# Patient Record
Sex: FEMALE | Race: WHITE | URBAN - METROPOLITAN AREA
[De-identification: names, ages, dates, MRNs, and addresses within clinical notes are randomized per-mention and may not be internally consistent; named-entity substitution may affect disease eponyms.]

---

## 2020-06-18 ENCOUNTER — OFFICE VISIT (OUTPATIENT)
Dept: URBAN - METROPOLITAN AREA CLINIC 128 | Facility: CLINIC | Age: 60
End: 2020-06-18

## 2020-06-18 RX ORDER — ESTRADIOL 0.1 MG/D
FILM, EXTENDED RELEASE TRANSDERMAL
Qty: 0 | Refills: 0 | COMMUNITY
Start: 1900-01-01

## 2020-06-18 RX ORDER — BIFIDOBACTERIUM LONGUM 10MM CELL
TAKE 1 CAPSULE BY ORAL ROUTE DAILY FOR 90 DAYS CAPSULE ORAL 1
Qty: 90 | Refills: 3 | COMMUNITY
Start: 2019-12-19 | End: 2020-12-13

## 2020-06-18 RX ORDER — DICYCLOMINE HYDROCHLORIDE 10 MG/1
TAKE 1 CAPSULE BY ORAL ROUTE 4 TIMES A DAY FOR 90 DAYS CAPSULE ORAL
Qty: 360 | Refills: 3 | COMMUNITY
Start: 2019-12-19 | End: 2020-12-13

## 2020-06-18 RX ORDER — DICYCLOMINE HYDROCHLORIDE 20 MG/2ML
INJECTION, SOLUTION INTRAMUSCULAR
Qty: 0 | Refills: 0 | COMMUNITY
Start: 1900-01-01

## 2020-06-18 RX ORDER — LEVOTHYROXINE SODIUM 100 UG/1
TABLET ORAL
Qty: 0 | Refills: 0 | COMMUNITY
Start: 1900-01-01

## 2020-07-25 ENCOUNTER — TELEPHONE ENCOUNTER (OUTPATIENT)
Dept: URBAN - METROPOLITAN AREA CLINIC 13 | Facility: CLINIC | Age: 60
End: 2020-07-25

## 2020-07-26 ENCOUNTER — TELEPHONE ENCOUNTER (OUTPATIENT)
Dept: URBAN - METROPOLITAN AREA CLINIC 13 | Facility: CLINIC | Age: 60
End: 2020-07-26

## 2020-07-26 RX ORDER — CALCIUM CITRATE/VITAMIN D3 315MG-6.25
TAKE 1 TABLET ONCE DAILY TABLET ORAL
Refills: 0 | Status: ACTIVE | COMMUNITY
Start: 2007-06-22

## 2020-07-26 RX ORDER — ACETAMINOPHEN, ASPIRIN (NSAID) AND CAFFEINE 250; 250; 65 MG/1; MG/1; MG/1
TAKE  TABLET AS NEEDED TABLET, FILM COATED ORAL
Refills: 0 | Status: ACTIVE | COMMUNITY
Start: 2007-06-22

## 2020-11-25 ENCOUNTER — OFFICE VISIT (OUTPATIENT)
Dept: URBAN - METROPOLITAN AREA CLINIC 128 | Facility: CLINIC | Age: 60
End: 2020-11-25

## 2020-12-21 ENCOUNTER — ERX REFILL RESPONSE (OUTPATIENT)
Dept: URBAN - METROPOLITAN AREA CLINIC 13 | Facility: CLINIC | Age: 60
End: 2020-12-21

## 2020-12-21 RX ORDER — DICYCLOMINE HYDROCHLORIDE 10 MG/1
TAKE ONE CAPSULE BY MOUTH FOUR TIMES A DAY CAPSULE ORAL
Qty: 360 | Refills: 2

## 2021-01-27 ENCOUNTER — WEB ENCOUNTER (OUTPATIENT)
Dept: URBAN - METROPOLITAN AREA CLINIC 19 | Facility: CLINIC | Age: 61
End: 2021-01-27

## 2021-01-27 ENCOUNTER — OFFICE VISIT (OUTPATIENT)
Dept: URBAN - METROPOLITAN AREA CLINIC 19 | Facility: CLINIC | Age: 61
End: 2021-01-27
Payer: COMMERCIAL

## 2021-01-27 VITALS
HEART RATE: 76 BPM | WEIGHT: 131.8 LBS | DIASTOLIC BLOOD PRESSURE: 67 MMHG | BODY MASS INDEX: 24.25 KG/M2 | SYSTOLIC BLOOD PRESSURE: 110 MMHG | HEIGHT: 62 IN | TEMPERATURE: 98 F

## 2021-01-27 DIAGNOSIS — Z87.19 HISTORY OF ULCERATIVE COLITIS: ICD-10-CM

## 2021-01-27 DIAGNOSIS — K91.850 POUCHITIS: ICD-10-CM

## 2021-01-27 DIAGNOSIS — R14.0 BLOATING: ICD-10-CM

## 2021-01-27 DIAGNOSIS — Z12.11 COLON CANCER SCREENING: ICD-10-CM

## 2021-01-27 DIAGNOSIS — K58.9 IBS (IRRITABLE BOWEL SYNDROME)-DIARRHEA: ICD-10-CM

## 2021-01-27 PROCEDURE — 3017F COLORECTAL CA SCREEN DOC REV: CPT | Performed by: INTERNAL MEDICINE

## 2021-01-27 PROCEDURE — G8420 CALC BMI NORM PARAMETERS: HCPCS | Performed by: INTERNAL MEDICINE

## 2021-01-27 PROCEDURE — 99214 OFFICE O/P EST MOD 30 MIN: CPT | Performed by: INTERNAL MEDICINE

## 2021-01-27 PROCEDURE — G8482 FLU IMMUNIZE ORDER/ADMIN: HCPCS | Performed by: INTERNAL MEDICINE

## 2021-01-27 PROCEDURE — G9903 PT SCRN TBCO ID AS NON USER: HCPCS | Performed by: INTERNAL MEDICINE

## 2021-01-27 PROCEDURE — G8427 DOCREV CUR MEDS BY ELIG CLIN: HCPCS | Performed by: INTERNAL MEDICINE

## 2021-01-27 RX ORDER — DICYCLOMINE HYDROCHLORIDE 20 MG/2ML
INJECTION, SOLUTION INTRAMUSCULAR
Qty: 0 | Refills: 0 | COMMUNITY
Start: 1900-01-01

## 2021-01-27 RX ORDER — DICYCLOMINE HYDROCHLORIDE 10 MG/1
TAKE ONE CAPSULE BY MOUTH FOUR TIMES A DAY CAPSULE ORAL
Qty: 360 | Refills: 2 | Status: ACTIVE | COMMUNITY

## 2021-01-27 RX ORDER — LEVOTHYROXINE SODIUM 100 UG/1
TABLET ORAL
Qty: 0 | Refills: 0 | COMMUNITY
Start: 1900-01-01

## 2021-01-27 RX ORDER — ESTRADIOL 0.1 MG/D
FILM, EXTENDED RELEASE TRANSDERMAL
Qty: 0 | Refills: 0 | COMMUNITY
Start: 1900-01-01

## 2021-01-27 RX ORDER — ACETAMINOPHEN, ASPIRIN (NSAID) AND CAFFEINE 250; 250; 65 MG/1; MG/1; MG/1
TAKE  TABLET AS NEEDED TABLET, FILM COATED ORAL
Refills: 0 | Status: ACTIVE | COMMUNITY
Start: 2007-06-22

## 2021-01-27 RX ORDER — CALCIUM CITRATE/VITAMIN D3 315MG-6.25
TAKE 1 TABLET ONCE DAILY TABLET ORAL
Refills: 0 | Status: ACTIVE | COMMUNITY
Start: 2007-06-22

## 2021-01-27 NOTE — HPI-TODAY'S VISIT:
pt last seen in 12/2019 prior hx of ulcerative colitis s/p colectomy with j pouch done 25 yrs ago. s/p pouchoscopy in 10/4/19 with normal biopsies she comes in feeling well had flare of diarrhea recently due to stres due to her  being hospitalized for CHF she is well controlled but avoids dairy, red meat, and heavy fiber intake she is now on align twice daily, bentyl qid, digestive enzymes, and peppermint occ bloating no n/v no dysphagia stable weight notes 1-2 formed stool per day no blood.  no pain previously tried xifaxan without relief normal fecal elastace in 2019 prior cte with left sided small bowel thickening with infectious vs inflammatory enteritis and hydronephrosis  previously with mild improvement with lactose free diet and FODMAP Diet.   last flex in 12/2016 with normal j pouch on exam and on pathology she drinks lactose free milk and avoids icecream.  ? lactose intolerance.    s/p candelaria  no other complaints.

## 2021-01-28 LAB
A/G RATIO: 2
ALBUMIN: 4.5
ALKALINE PHOSPHATASE: 85
ALT (SGPT): 12
AST (SGOT): 23
BASO (ABSOLUTE): 0
BASOS: 1
BILIRUBIN, TOTAL: 0.4
BUN/CREATININE RATIO: 13
BUN: 9
C-REACTIVE PROTEIN, QUANT: 1
CALCIUM: 9.7
CARBON DIOXIDE, TOTAL: 26
CHLORIDE: 99
CREATININE: 0.69
EGFR IF AFRICN AM: 110
EGFR IF NONAFRICN AM: 96
EOS (ABSOLUTE): 0.1
EOS: 2
GLOBULIN, TOTAL: 2.2
GLUCOSE: 84
HEMATOCRIT: 44.2
HEMATOLOGY COMMENTS:: (no result)
HEMOGLOBIN: 14.6
IMMATURE CELLS: (no result)
IMMATURE GRANS (ABS): 0
IMMATURE GRANULOCYTES: 0
LYMPHS (ABSOLUTE): 1.2
LYMPHS: 22
MCH: 30
MCHC: 33
MCV: 91
MONOCYTES(ABSOLUTE): 0.5
MONOCYTES: 9
NEUTROPHILS (ABSOLUTE): 3.5
NEUTROPHILS: 66
NRBC: (no result)
PLATELETS: 258
POTASSIUM: 4.5
PROTEIN, TOTAL: 6.7
RBC: 4.86
RDW: 12.5
SODIUM: 138
WBC: 5.4

## 2021-03-11 ENCOUNTER — TELEPHONE ENCOUNTER (OUTPATIENT)
Dept: URBAN - METROPOLITAN AREA CLINIC 19 | Facility: CLINIC | Age: 61
End: 2021-03-11

## 2021-03-11 RX ORDER — CALCIUM CITRATE/VITAMIN D3 315MG-6.25
TAKE 1 TABLET ONCE DAILY TABLET ORAL
Refills: 0 | Status: ACTIVE | COMMUNITY
Start: 2007-06-22

## 2021-03-11 RX ORDER — LEVOTHYROXINE SODIUM 100 UG/1
TABLET ORAL
Qty: 0 | Refills: 0 | COMMUNITY
Start: 1900-01-01

## 2021-03-11 RX ORDER — DICYCLOMINE HYDROCHLORIDE 20 MG/2ML
INJECTION, SOLUTION INTRAMUSCULAR
Qty: 0 | Refills: 0 | COMMUNITY
Start: 1900-01-01

## 2021-03-11 RX ORDER — HYOSCYAMINE SULFATE 0.12 MG/1
1 TABLET UNDER THE TONGUE AND ALLOW TO DISSOLVE  AS NEEDED TABLET ORAL; SUBLINGUAL THREE TIMES A DAY
Qty: 90 | Refills: 1 | OUTPATIENT
Start: 2021-03-15 | End: 2021-09-11

## 2021-03-11 RX ORDER — DICYCLOMINE HYDROCHLORIDE 10 MG/1
TAKE ONE CAPSULE BY MOUTH FOUR TIMES A DAY CAPSULE ORAL
Qty: 360 | Refills: 2 | Status: ACTIVE | COMMUNITY

## 2021-03-11 RX ORDER — ESTRADIOL 0.1 MG/D
FILM, EXTENDED RELEASE TRANSDERMAL
Qty: 0 | Refills: 0 | COMMUNITY
Start: 1900-01-01

## 2021-03-11 RX ORDER — ACETAMINOPHEN, ASPIRIN (NSAID) AND CAFFEINE 250; 250; 65 MG/1; MG/1; MG/1
TAKE  TABLET AS NEEDED TABLET, FILM COATED ORAL
Refills: 0 | Status: ACTIVE | COMMUNITY
Start: 2007-06-22

## 2021-03-31 ENCOUNTER — TELEPHONE ENCOUNTER (OUTPATIENT)
Dept: URBAN - METROPOLITAN AREA CLINIC 96 | Facility: CLINIC | Age: 61
End: 2021-03-31

## 2021-09-21 ENCOUNTER — ERX REFILL RESPONSE (OUTPATIENT)
Dept: URBAN - METROPOLITAN AREA CLINIC 80 | Facility: CLINIC | Age: 61
End: 2021-09-21

## 2021-09-21 RX ORDER — DICYCLOMINE HYDROCHLORIDE 10 MG/1
TAKE ONE CAPSULE BY MOUTH FOUR TIMES A DAY CAPSULE ORAL
Qty: 360 CAPSULE | Refills: 3 | OUTPATIENT

## 2021-09-21 RX ORDER — DICYCLOMINE HYDROCHLORIDE 10 MG/1
TAKE ONE CAPSULE BY MOUTH FOUR TIMES A DAY CAPSULE ORAL
Qty: 360 | Refills: 2 | OUTPATIENT

## 2022-01-12 ENCOUNTER — OFFICE VISIT (OUTPATIENT)
Dept: URBAN - METROPOLITAN AREA CLINIC 19 | Facility: CLINIC | Age: 62
End: 2022-01-12

## 2022-02-09 ENCOUNTER — OFFICE VISIT (OUTPATIENT)
Dept: URBAN - METROPOLITAN AREA CLINIC 19 | Facility: CLINIC | Age: 62
End: 2022-02-09
Payer: COMMERCIAL

## 2022-02-09 VITALS
WEIGHT: 135 LBS | TEMPERATURE: 98.2 F | SYSTOLIC BLOOD PRESSURE: 128 MMHG | HEART RATE: 75 BPM | BODY MASS INDEX: 24.84 KG/M2 | DIASTOLIC BLOOD PRESSURE: 76 MMHG | HEIGHT: 62 IN

## 2022-02-09 DIAGNOSIS — K91.850 POUCHITIS: ICD-10-CM

## 2022-02-09 DIAGNOSIS — Z87.19 HISTORY OF ULCERATIVE COLITIS: ICD-10-CM

## 2022-02-09 DIAGNOSIS — Z12.11 COLON CANCER SCREENING: ICD-10-CM

## 2022-02-09 DIAGNOSIS — K58.9 IBS (IRRITABLE BOWEL SYNDROME)-DIARRHEA: ICD-10-CM

## 2022-02-09 DIAGNOSIS — R14.0 BLOATING: ICD-10-CM

## 2022-02-09 PROCEDURE — 99213 OFFICE O/P EST LOW 20 MIN: CPT | Performed by: INTERNAL MEDICINE

## 2022-02-09 RX ORDER — DICYCLOMINE HYDROCHLORIDE 20 MG/2ML
INJECTION, SOLUTION INTRAMUSCULAR
Qty: 0 | Refills: 0 | COMMUNITY
Start: 1900-01-01

## 2022-02-09 RX ORDER — DICYCLOMINE HYDROCHLORIDE 10 MG/1
TAKE ONE CAPSULE BY MOUTH FOUR TIMES A DAY CAPSULE ORAL
Qty: 360 CAPSULE | Refills: 3 | Status: ACTIVE | COMMUNITY

## 2022-02-09 RX ORDER — CALCIUM CITRATE/VITAMIN D3 315MG-6.25
TAKE 1 TABLET ONCE DAILY TABLET ORAL
Refills: 0 | Status: ACTIVE | COMMUNITY
Start: 2007-06-22

## 2022-02-09 RX ORDER — ESTRADIOL 0.1 MG/D
FILM, EXTENDED RELEASE TRANSDERMAL
Qty: 0 | Refills: 0 | COMMUNITY
Start: 1900-01-01

## 2022-02-09 RX ORDER — ACETAMINOPHEN, ASPIRIN (NSAID) AND CAFFEINE 250; 250; 65 MG/1; MG/1; MG/1
TAKE  TABLET AS NEEDED TABLET, FILM COATED ORAL
Refills: 0 | Status: ACTIVE | COMMUNITY
Start: 2007-06-22

## 2022-02-09 RX ORDER — LEVOTHYROXINE SODIUM 100 UG/1
TABLET ORAL
Qty: 0 | Refills: 0 | COMMUNITY
Start: 1900-01-01

## 2022-02-09 NOTE — HPI-TODAY'S VISIT:
pt last seen in 1/2021 prior hx of ulcerative colitis s/p colectomy with j pouch done 25 yrs ago. s/p pouchoscopy in 10/4/19 with normal biopsies had normal labs  here for 1 year f/u  she continues to do well normal stools occ flares related to stress from caring from her  who has chf she is avoiding dairy, MSG and casen she is on probiotics, tid bentyl and peppermint supplement and doing well normal appetite rare bloating no n/v no dysphagia stable weight  stools at baseline with 1-2 formed stool per day no blood.  no mucus  no other complaints.    Prior hx: previously tried xifaxan without relief normal fecal elastace in 2019 prior cte with left sided small bowel thickening with infectious vs inflammatory enteritis and hydronephrosis  previously with mild improvement with lactose free diet and FODMAP Diet.   last flex in 12/2016 with normal j pouch on exam and on pathology she drinks lactose free milk and avoids icecream.  ? lactose intolerance.    s/p candelaria

## 2022-02-12 LAB
A/G RATIO: 2.3
ALBUMIN: 4.6
ALKALINE PHOSPHATASE: 85
ALT (SGPT): 11
AST (SGOT): 22
BASO (ABSOLUTE): 0
BASOS: 0
BILIRUBIN, TOTAL: 0.3
BUN/CREATININE RATIO: 8
BUN: 6
C-REACTIVE PROTEIN, QUANT: <1
CALCIUM: 9.5
CARBON DIOXIDE, TOTAL: 23
CHLORIDE: 101
CREATININE: 0.78
EGFR IF AFRICN AM: 96
EGFR IF NONAFRICN AM: 83
EOS (ABSOLUTE): 0.1
EOS: 2
FOLATE (FOLIC ACID), SERUM: >20
GLOBULIN, TOTAL: 2
GLUCOSE: 91
HEMATOCRIT: 43
HEMATOLOGY COMMENTS:: (no result)
HEMOGLOBIN: 14.8
IMMATURE CELLS: (no result)
IMMATURE GRANS (ABS): 0
IMMATURE GRANULOCYTES: 0
LYMPHS (ABSOLUTE): 1.3
LYMPHS: 25
MCH: 30.8
MCHC: 34.4
MCV: 90
MONOCYTES(ABSOLUTE): 0.5
MONOCYTES: 10
NEUTROPHILS (ABSOLUTE): 3.1
NEUTROPHILS: 63
NRBC: (no result)
PLATELETS: 232
POTASSIUM: 4.5
PROTEIN, TOTAL: 6.6
RBC: 4.8
RDW: 12.7
SODIUM: 140
VITAMIN B12: 411
VITAMIN D, 25-HYDROXY: 30.6
WBC: 5

## 2022-03-07 NOTE — PHYSICAL EXAM NECK/THYROID:
Established Patient        Chief Complaint:   Chief Complaint   Patient presents with   • Rash        Juana Layton is a 50 y.o. female    History of Present Illness:   Here today with complaints of a pruritic rash that developed to bilateral upper extremities, as well as other satellite areas of the body.  She is developed some myalgia symptoms additionally to the extremities, particularly more noticeable to the right upper extremity.  She denies any fever, chills or night sweats.  She has had experiences with contact dermatitis, plant origin, in the past, symptoms are similar but magnified.    He denies any chest pain, syncope, palpitations or vertigo.  No aspiration or dysphagia.  No increased work of breathing.  Denies fever, chills or night sweats.  Good urine output without hematuria.  No BRB/BTS reported.  No suspicious food intake, no history of anaphylaxis.    Subjective     The following portions of the patient's history were reviewed and updated as appropriate: allergies, current medications, past family history, past medical history, past social history, past surgical history and problem list.    No Known Allergies    Review of Systems  1. Constitutional: Negative for fever. Negative for chills, diaphoresis, fatigue and unexpected weight change.   2. HENT: No dysphagia; no changes to vision/hearing/smell/taste; no epistaxis  3. Eyes: Negative for redness and visual disturbance.   4. Respiratory: negative for shortness of breath. Negative for chest pain . Negative for cough and chest tightness.   5. Cardiovascular: Negative for chest pain and palpitations.   6. Gastrointestinal: Negative for abdominal distention, abdominal pain and blood in stool.   7. Endocrine: Negative for cold intolerance and heat intolerance.   8. Genitourinary: Negative for difficulty urinating, dysuria and frequency.   9. Musculoskeletal: Generalized myalgias, worse to the right upper  normal appearance , without tenderness upon palpation , no deformities , trachea midline , Thyroid normal size , no thyroid nodules , no masses , no JVD , thyroid nontender "extremity.  10. Skin: Pruritic dermatitis as per above.  11. Neurological: Negative for syncope, weakness and headaches.   12. Hematological: Negative for adenopathy. Does not bruise/bleed easily.   13. Psychiatric/Behavioral: Negative for confusion. The patient is not nervous/anxious.    Objective     Physical Exam   Vital Signs: /78   Pulse 111   Temp 98.4 °F (36.9 °C)   Resp 18   Ht 165.1 cm (65\")   Wt 77.5 kg (170 lb 12.8 oz)   SpO2 95%   BMI 28.42 kg/m²     General Appearance: alert, oriented x 3, no acute distress.  Skin: warm and dry.  Signs of excoriation noted to bilateral upper extremities, including the forearm/wrist and elbows.  Several other satellite areas noted to the legs and shoulders.  Associated linear/vesicular areas to affected areas.  HEENT: Atraumatic.  pupils round and reactive to light and accommodation, oral mucosa pink and moist.  Nares patent without epistaxis.  External auditory canals are patent tympanic membranes intact.  Neck: supple, no JVD, trachea midline.  No thyromegaly  Lungs: CTA, unlabored breathing effort.  Heart: 72 bpm on my exam, normal S1 and S2, no S3, no rub.  Abdomen: soft, non-tender, no palpable bladder, present bowel sounds to auscultation ×4.  No guarding or rigidity.  Extremities: no clubbing, cyanosis or edema.  Good range of motion actively and passively.  Symmetric muscle strength and development  Neuro: normal speech and mental status.  Cranial nerves II through XII intact.  No anosmia. DTR 2+; proprioception intact.  No focal motor/sensory deficits.    Assessment and Plan      Assessment/Plan:   Diagnoses and all orders for this visit:    1. Pruritic dermatitis (Primary)  -     TSH  -     T4, Free  -     Comprehensive Metabolic Panel  -     CBC & Differential  -     dexamethasone (DECADRON) injection 10 mg  -     dexamethasone (DECADRON) 0.5 MG tablet; 6 po x1d; then 5 po x 2d; then 4 po x 2d; then 3 po x 2d; then 2 po x 2d; then 1 po x1d; then " STOP  Dispense: 35 tablet; Refill: 0    2. Myalgia  -     TSH  -     T4, Free  -     Comprehensive Metabolic Panel  -     CBC & Differential    3. Screen for colon cancer  -     Cologuard - Stool, Per Rectum; Future      Patient is given a 10 mg IM injection of dexamethasone today.  This will be followed by 10 day low-dose dexamethasone taper.    Vital signs demonstrate hemodynamic stability.  I have discussed the need for cool compress to the affected areas to aid in itching, notify the office should her symptoms not improve.    Given the associated myalgias, I recommended evaluation of her thyroid function, CMP and CBC.  I do suspect that her myalgias are result of the immune response is related to numerous areas of dermatitis involvement.    Cologuard ordered for colorectal cancer screening.      Discussion Summary:    I spent 25 minutes caring for Juana on this date of service. This time includes time spent by me in the following activities:preparing for the visit, performing a medically appropriate examination and/or evaluation , counseling and educating the patient/family/caregiver, ordering medications, tests, or procedures, documenting information in the medical record and care coordination    Discussed plan of care in detail with pt today; pt verb understanding and agrees.  Follow up:  No follow-ups on file.     There are no Patient Instructions on file for this visit.    Giovanni Pryor,   03/07/22  15:02 EST          Please note that portions of this note may have been completed with a voice recognition program. Efforts were made to edit the dictations, but occasionally words are mistranscribed.

## 2022-03-15 ENCOUNTER — WEB ENCOUNTER (OUTPATIENT)
Dept: URBAN - METROPOLITAN AREA CLINIC 19 | Facility: CLINIC | Age: 62
End: 2022-03-15

## 2022-06-22 ENCOUNTER — TELEPHONE ENCOUNTER (OUTPATIENT)
Dept: URBAN - METROPOLITAN AREA CLINIC 19 | Facility: CLINIC | Age: 62
End: 2022-06-22

## 2022-06-22 ENCOUNTER — TELEPHONE ENCOUNTER (OUTPATIENT)
Dept: URBAN - METROPOLITAN AREA CLINIC 92 | Facility: CLINIC | Age: 62
End: 2022-06-22

## 2022-06-22 RX ORDER — DICYCLOMINE HYDROCHLORIDE 10 MG/1
TAKE ONE CAPSULE BY MOUTH FOUR TIMES A DAY CAPSULE ORAL
Qty: 360 CAPSULE | Refills: 3

## 2022-06-28 ENCOUNTER — ERX REFILL RESPONSE (OUTPATIENT)
Dept: URBAN - METROPOLITAN AREA CLINIC 80 | Facility: CLINIC | Age: 62
End: 2022-06-28

## 2022-06-28 RX ORDER — DICYCLOMINE HYDROCHLORIDE 20 MG/2ML
INJECTION, SOLUTION INTRAMUSCULAR
Qty: 0 | Refills: 0 | OUTPATIENT

## 2022-06-28 RX ORDER — DICYCLOMINE HYDROCHLORIDE 10 MG/1
TAKE ONE CAPSULE BY MOUTH FOUR TIMES A DAY CAPSULE ORAL
Qty: 360 CAPSULE | Refills: 0 | OUTPATIENT

## 2023-02-08 ENCOUNTER — TELEPHONE ENCOUNTER (OUTPATIENT)
Dept: URBAN - METROPOLITAN AREA CLINIC 19 | Facility: CLINIC | Age: 63
End: 2023-02-08

## 2023-02-08 ENCOUNTER — OFFICE VISIT (OUTPATIENT)
Dept: URBAN - METROPOLITAN AREA CLINIC 19 | Facility: CLINIC | Age: 63
End: 2023-02-08
Payer: COMMERCIAL

## 2023-02-08 VITALS
SYSTOLIC BLOOD PRESSURE: 110 MMHG | BODY MASS INDEX: 24.48 KG/M2 | HEIGHT: 62 IN | DIASTOLIC BLOOD PRESSURE: 80 MMHG | WEIGHT: 133 LBS | TEMPERATURE: 97.9 F

## 2023-02-08 DIAGNOSIS — Z12.11 COLON CANCER SCREENING: ICD-10-CM

## 2023-02-08 DIAGNOSIS — K91.850 POUCHITIS: ICD-10-CM

## 2023-02-08 DIAGNOSIS — K58.9 ADAPTIVE COLITIS: ICD-10-CM

## 2023-02-08 DIAGNOSIS — R14.0 BLOATING: ICD-10-CM

## 2023-02-08 PROBLEM — 10743008 IRRITABLE BOWEL SYNDROME: Status: ACTIVE | Noted: 2021-01-27

## 2023-02-08 PROCEDURE — 99213 OFFICE O/P EST LOW 20 MIN: CPT | Performed by: INTERNAL MEDICINE

## 2023-02-08 RX ORDER — DICYCLOMINE HYDROCHLORIDE 10 MG/1
TAKE ONE CAPSULE BY MOUTH FOUR TIMES A DAY CAPSULE ORAL
Qty: 360 CAPSULE | Refills: 0 | Status: ACTIVE | COMMUNITY

## 2023-02-08 RX ORDER — LEVOTHYROXINE SODIUM 100 UG/1
TABLET ORAL
Qty: 0 | Refills: 0 | COMMUNITY
Start: 1900-01-01

## 2023-02-08 RX ORDER — RIFAXIMIN 550 MG/1
1 TABLET TABLET ORAL THREE TIMES A DAY
Qty: 42 TABLET | Refills: 3 | OUTPATIENT
Start: 2023-02-08 | End: 2023-04-05

## 2023-02-08 RX ORDER — ACETAMINOPHEN, ASPIRIN (NSAID) AND CAFFEINE 250; 250; 65 MG/1; MG/1; MG/1
TAKE  TABLET AS NEEDED TABLET, FILM COATED ORAL
Refills: 0 | Status: ACTIVE | COMMUNITY
Start: 2007-06-22

## 2023-02-08 RX ORDER — ESTRADIOL 0.1 MG/D
FILM, EXTENDED RELEASE TRANSDERMAL
Qty: 0 | Refills: 0 | COMMUNITY
Start: 1900-01-01

## 2023-02-08 RX ORDER — CALCIUM CITRATE/VITAMIN D3 315MG-6.25
TAKE 1 TABLET ONCE DAILY TABLET ORAL
Refills: 0 | Status: ACTIVE | COMMUNITY
Start: 2007-06-22

## 2023-02-08 RX ORDER — RIFAXIMIN 550 MG/1
1 TABLET TABLET ORAL THREE TIMES A DAY
Qty: 42 TABLET | Refills: 3 | Status: ACTIVE | COMMUNITY
Start: 2023-02-08 | End: 2023-04-05

## 2023-02-08 RX ORDER — RIFAXIMIN 550 MG/1
1 TABLET TABLET ORAL THREE TIMES A DAY
Qty: 90 TABLET | OUTPATIENT
Start: 2023-02-09 | End: 2023-03-11

## 2023-02-08 NOTE — HPI-TODAY'S VISIT:
pt last seen in 2/8/2022 prior hx of ulcerative colitis s/p colectomy with j pouch done 25 yrs ago. s/p pouchoscopy in 10/4/19 with normal biopsies had normal labs in 2/2022 here for 1 year f/u  she has been well still notes some diarrhea on and off often relates to eating vegetables no abd pain no n/v she continues to limit her diet and avoids dairy, MSG, ham, and casen she is on probiotics, tid bentyl and peppermint supplement normal appetite stable weight.  working on weight loss no n/v no dysphagia  notes loose 1-3 bm/day no blood.  no mucus  no other complaints.    Prior hx: previously tried xifaxan without relief normal fecal elastace in 2019 prior cte with left sided small bowel thickening with infectious vs inflammatory enteritis and hydronephrosis  previously with mild improvement with lactose free diet and FODMAP Diet.   last flex in 12/2016 with normal j pouch on exam and on pathology she drinks lactose free milk and avoids icecream.  ? lactose intolerance.    s/p candelaria

## 2023-02-09 LAB
A/G RATIO: 2.1
ALBUMIN: 4.6
ALKALINE PHOSPHATASE: 75
ALT (SGPT): 12
AST (SGOT): 19
BILIRUBIN, TOTAL: 0.4
BUN/CREATININE RATIO: 9
BUN: 6
C-REACTIVE PROTEIN, QUANT: <1
CALCIUM: 9.3
CARBON DIOXIDE, TOTAL: 24
CHLORIDE: 103
CREATININE: 0.69
EGFR: 99
GLOBULIN, TOTAL: 2.2
GLUCOSE: 83
HEMATOCRIT: 44.2
HEMOGLOBIN: 14.7
MCH: 30.7
MCHC: 33.3
MCV: 92
NRBC: (no result)
PLATELETS: 215
POTASSIUM: 4.7
PROTEIN, TOTAL: 6.8
RBC: 4.79
RDW: 12.3
SEDIMENTATION RATE-WESTERGREN: 2
SODIUM: 140
WBC: 5.6

## 2023-02-17 ENCOUNTER — WEB ENCOUNTER (OUTPATIENT)
Dept: URBAN - METROPOLITAN AREA CLINIC 19 | Facility: CLINIC | Age: 63
End: 2023-02-17

## 2023-03-20 ENCOUNTER — WEB ENCOUNTER (OUTPATIENT)
Dept: URBAN - METROPOLITAN AREA CLINIC 19 | Facility: CLINIC | Age: 63
End: 2023-03-20

## 2023-05-23 ENCOUNTER — OFFICE VISIT (OUTPATIENT)
Dept: URBAN - METROPOLITAN AREA CLINIC 19 | Facility: CLINIC | Age: 63
End: 2023-05-23
Payer: COMMERCIAL

## 2023-05-23 ENCOUNTER — TELEPHONE ENCOUNTER (OUTPATIENT)
Dept: URBAN - METROPOLITAN AREA CLINIC 80 | Facility: CLINIC | Age: 63
End: 2023-05-23

## 2023-05-23 ENCOUNTER — LAB OUTSIDE AN ENCOUNTER (OUTPATIENT)
Dept: URBAN - METROPOLITAN AREA CLINIC 19 | Facility: CLINIC | Age: 63
End: 2023-05-23

## 2023-05-23 VITALS
HEIGHT: 62 IN | WEIGHT: 132 LBS | TEMPERATURE: 98.4 F | DIASTOLIC BLOOD PRESSURE: 78 MMHG | BODY MASS INDEX: 24.29 KG/M2 | SYSTOLIC BLOOD PRESSURE: 128 MMHG

## 2023-05-23 DIAGNOSIS — R11.14 BILIOUS VOMITING WITH NAUSEA: ICD-10-CM

## 2023-05-23 DIAGNOSIS — R19.7 DIARRHEA, UNSPECIFIED TYPE: ICD-10-CM

## 2023-05-23 DIAGNOSIS — Z87.19 HISTORY OF ULCERATIVE COLITIS: ICD-10-CM

## 2023-05-23 DIAGNOSIS — R14.0 BLOATING: ICD-10-CM

## 2023-05-23 DIAGNOSIS — K30 INDIGESTION: ICD-10-CM

## 2023-05-23 DIAGNOSIS — K58.9 IBS (IRRITABLE BOWEL SYNDROME)-DIARRHEA: ICD-10-CM

## 2023-05-23 PROBLEM — 162031009: Status: ACTIVE | Noted: 2023-05-23

## 2023-05-23 PROCEDURE — 99215 OFFICE O/P EST HI 40 MIN: CPT | Performed by: NURSE PRACTITIONER

## 2023-05-23 PROCEDURE — 99215 OFFICE O/P EST HI 40 MIN: CPT | Performed by: INTERNAL MEDICINE

## 2023-05-23 RX ORDER — LEVOTHYROXINE SODIUM 100 UG/1
TABLET ORAL
Qty: 0 | Refills: 0 | COMMUNITY
Start: 1900-01-01

## 2023-05-23 RX ORDER — DICYCLOMINE HYDROCHLORIDE 10 MG/1
1 CAPSULE CAPSULE ORAL
Qty: 120 CAPSULE | Refills: 5 | OUTPATIENT
Start: 2023-05-23 | End: 2023-11-18

## 2023-05-23 RX ORDER — PROMETHAZINE HYDROCHLORIDE 25 MG/1
1 TABLET AS NEEDED TABLET ORAL
Qty: 60 | Refills: 0 | OUTPATIENT
Start: 2023-05-23 | End: 2023-06-22

## 2023-05-23 RX ORDER — ACETAMINOPHEN, ASPIRIN (NSAID) AND CAFFEINE 250; 250; 65 MG/1; MG/1; MG/1
TAKE  TABLET AS NEEDED TABLET, FILM COATED ORAL
Refills: 0 | Status: DISCONTINUED | COMMUNITY
Start: 2007-06-22

## 2023-05-23 RX ORDER — CALCIUM CITRATE/VITAMIN D3 315MG-6.25
TAKE 1 TABLET ONCE DAILY TABLET ORAL
Refills: 0 | Status: DISCONTINUED | COMMUNITY
Start: 2007-06-22

## 2023-05-23 RX ORDER — DICYCLOMINE HYDROCHLORIDE 10 MG/1
TAKE ONE CAPSULE BY MOUTH FOUR TIMES A DAY CAPSULE ORAL
Qty: 360 CAPSULE | Refills: 0 | Status: ACTIVE | COMMUNITY

## 2023-05-23 RX ORDER — ESTRADIOL 0.1 MG/D
FILM, EXTENDED RELEASE TRANSDERMAL
Qty: 0 | Refills: 0 | COMMUNITY
Start: 1900-01-01

## 2023-05-23 NOTE — HPI-TODAY'S VISIT:
2/8/23 DR Rodriguez   pt last seen in 2/8/2022 prior hx of ulcerative colitis s/p colectomy with j pouch done 25 yrs ago. s/p pouchoscopy in 10/4/19 with normal biopsies had normal labs in 2/2022,    here for 1 year f/u she has been well, still notes some diarrhea on and off, often relates to eating vegetables no abd pain, no n/v she continues to limit her diet and avoids dairy, MSG, ham, and casen she is on probiotics, tid bentyl and peppermint supplement normal appetite, stable weight.  working on weight loss, no n/v, no dysphagia notes loose 1-3 bm/day, no blood. , no mucus Prior hx: previously tried xifaxan without relief normal fecal elastace in 2019 prior cte with left sided small bowel thickening with infectious vs inflammatory enteritis and hydronephrosis previously with mild improvement with lactose free diet and FODMAP Diet.   last flex in 12/2016 with normal j pouch on exam and on pathology she drinks lactose free milk and avoids icecream.  ? lactose intolerance.   s/p candelaria PLAN   Labs, Xifaxan, low FODMAP, Bentyl and peppermint, probiotics  pouchoscopy 2019 was able to advance into the small bowel normal findings. Pouchoscopy 2016 normal EGD 2007 gastritis otherwise normal CT enterography 2019 wall thickening involving the small bowel loops in the left upper quad distal postop changes with J-pouch status post colectomy impression wall thickening of small bowel loops consistent with inflammation or any factious enteritis.  TODAY 5/23/23 Kezia DAVIS NP IBD HX  Dx in 1988, Pancolitis UC TX enema's, then surgery  Labs ESR normal, CRP normal, CMP normal, CBC normal.  She did complete a round of xifaxan and did develop some constipation. She started having watery stools about a month ago, 3-4 times  day. Has had loose before but not this bad. No blood. Has had pouchitis before and typical symptoms are bloating and blood in stools.  She is having nausea and vomiting, worse with certain foods, along with gas, belching, bloating, indigestion, abdominal pain.  Has dropped down to eggs, bread and soups only.

## 2023-05-24 ENCOUNTER — WEB ENCOUNTER (OUTPATIENT)
Dept: URBAN - METROPOLITAN AREA CLINIC 19 | Facility: CLINIC | Age: 63
End: 2023-05-24

## 2023-05-26 ENCOUNTER — OFFICE VISIT (OUTPATIENT)
Dept: URBAN - METROPOLITAN AREA SURGERY CENTER 31 | Facility: SURGERY CENTER | Age: 63
End: 2023-05-26
Payer: COMMERCIAL

## 2023-05-26 DIAGNOSIS — K29.80 DUODENITIS: ICD-10-CM

## 2023-05-26 DIAGNOSIS — K29.60 OTHER GASTRITIS WITHOUT BLEEDING: ICD-10-CM

## 2023-05-26 DIAGNOSIS — R14.0 BLOATING: ICD-10-CM

## 2023-05-26 DIAGNOSIS — K26.9 DUODENAL EROSION: ICD-10-CM

## 2023-05-26 PROCEDURE — G8907 PT DOC NO EVENTS ON DISCHARG: HCPCS | Performed by: INTERNAL MEDICINE

## 2023-05-26 PROCEDURE — 43239 EGD BIOPSY SINGLE/MULTIPLE: CPT | Performed by: INTERNAL MEDICINE

## 2023-05-30 ENCOUNTER — WEB ENCOUNTER (OUTPATIENT)
Dept: URBAN - METROPOLITAN AREA CLINIC 19 | Facility: CLINIC | Age: 63
End: 2023-05-30

## 2023-06-01 ENCOUNTER — WEB ENCOUNTER (OUTPATIENT)
Dept: URBAN - METROPOLITAN AREA CLINIC 128 | Facility: CLINIC | Age: 63
End: 2023-06-01

## 2023-06-02 ENCOUNTER — WEB ENCOUNTER (OUTPATIENT)
Dept: URBAN - METROPOLITAN AREA CLINIC 128 | Facility: CLINIC | Age: 63
End: 2023-06-02

## 2023-06-02 ENCOUNTER — OFFICE VISIT (OUTPATIENT)
Dept: URBAN - METROPOLITAN AREA SURGERY CENTER 31 | Facility: SURGERY CENTER | Age: 63
End: 2023-06-02

## 2023-06-05 ENCOUNTER — WEB ENCOUNTER (OUTPATIENT)
Dept: URBAN - METROPOLITAN AREA CLINIC 128 | Facility: CLINIC | Age: 63
End: 2023-06-05

## 2023-06-07 ENCOUNTER — WEB ENCOUNTER (OUTPATIENT)
Dept: URBAN - METROPOLITAN AREA CLINIC 128 | Facility: CLINIC | Age: 63
End: 2023-06-07

## 2023-06-19 ENCOUNTER — WEB ENCOUNTER (OUTPATIENT)
Dept: URBAN - METROPOLITAN AREA CLINIC 128 | Facility: CLINIC | Age: 63
End: 2023-06-19

## 2023-07-17 ENCOUNTER — WEB ENCOUNTER (OUTPATIENT)
Dept: URBAN - METROPOLITAN AREA CLINIC 128 | Facility: CLINIC | Age: 63
End: 2023-07-17

## 2023-07-17 RX ORDER — DICYCLOMINE HYDROCHLORIDE 10 MG/1
1 CAPSULE CAPSULE ORAL
Qty: 120 CAPSULE | Refills: 5
Start: 2023-05-23 | End: 2024-01-15

## 2023-07-18 ENCOUNTER — WEB ENCOUNTER (OUTPATIENT)
Dept: URBAN - METROPOLITAN AREA CLINIC 128 | Facility: CLINIC | Age: 63
End: 2023-07-18

## 2023-07-18 RX ORDER — DICYCLOMINE HYDROCHLORIDE 10 MG/1
1 CAPSULE CAPSULE ORAL
Qty: 120 CAPSULE | Refills: 5
Start: 2023-05-23 | End: 2024-01-20

## 2023-07-24 ENCOUNTER — ERX REFILL RESPONSE (OUTPATIENT)
Dept: URBAN - METROPOLITAN AREA CLINIC 80 | Facility: CLINIC | Age: 63
End: 2023-07-24

## 2023-07-24 RX ORDER — DICYCLOMINE HYDROCHLORIDE 10 MG/1
TAKE ONE CAPSULE BY MOUTH FOUR TIMES A DAY CAPSULE ORAL
Qty: 360 CAPSULE | Refills: 0 | OUTPATIENT

## 2023-07-24 RX ORDER — DICYCLOMINE HYDROCHLORIDE 10 MG/1
TAKE ONE CAPSULE BY MOUTH FOUR TIMES A DAY FOR 90 DAYS CAPSULE ORAL
Qty: 360 CAPSULE | Refills: 0 | OUTPATIENT

## 2024-02-28 ENCOUNTER — OV EP (OUTPATIENT)
Dept: URBAN - METROPOLITAN AREA CLINIC 128 | Facility: CLINIC | Age: 64
End: 2024-02-28
Payer: COMMERCIAL

## 2024-02-28 VITALS
DIASTOLIC BLOOD PRESSURE: 68 MMHG | BODY MASS INDEX: 23.11 KG/M2 | SYSTOLIC BLOOD PRESSURE: 120 MMHG | TEMPERATURE: 97.3 F | WEIGHT: 125.6 LBS | HEIGHT: 62 IN

## 2024-02-28 DIAGNOSIS — K58.9 IBS (IRRITABLE BOWEL SYNDROME)-DIARRHEA: ICD-10-CM

## 2024-02-28 DIAGNOSIS — Z87.19 HISTORY OF ULCERATIVE COLITIS: ICD-10-CM

## 2024-02-28 DIAGNOSIS — R14.0 BLOATING: ICD-10-CM

## 2024-02-28 DIAGNOSIS — Z12.11 COLON CANCER SCREENING: ICD-10-CM

## 2024-02-28 DIAGNOSIS — K91.850 POUCHITIS: ICD-10-CM

## 2024-02-28 PROCEDURE — 99213 OFFICE O/P EST LOW 20 MIN: CPT | Performed by: INTERNAL MEDICINE

## 2024-02-28 RX ORDER — ESTRADIOL 0.1 MG/D
FILM, EXTENDED RELEASE TRANSDERMAL
Qty: 0 | Refills: 0 | Status: ACTIVE | COMMUNITY
Start: 1900-01-01

## 2024-02-28 RX ORDER — LEVOTHYROXINE SODIUM 100 UG/1
TABLET ORAL
Qty: 0 | Refills: 0 | Status: ACTIVE | COMMUNITY
Start: 1900-01-01

## 2024-02-28 RX ORDER — DICYCLOMINE HYDROCHLORIDE 10 MG/1
TAKE ONE CAPSULE BY MOUTH FOUR TIMES A DAY FOR 90 DAYS CAPSULE ORAL
Qty: 360 CAPSULE | Refills: 0 | Status: ACTIVE | COMMUNITY

## 2024-02-28 NOTE — HPI-TODAY'S VISIT:
pt is pleasant 62 yo female last seen in 5/2023 prior hx of ulcerative colitis s/p colectomy with j pouch done 25 yrs ago. s/p pouchoscopy in 10/4/19 with normal biopsies hx of diarrhea and bloating managed with bentyl here for follow up  pt seen last year with Kezia Lee for ongoign issues with bloating stool test and CT enterography ordered but not done pt notes busy year after developing osteomyelitis and needing IV abx o/w has been well prior issues with loose stools/diarrhea responsive to bentyl worse with fatty/rich food.  does not want to have fecal elastace checked no abd pain, no n/v she continues to limit her diet and avoids dairy, MSG, ham, and casen she is on probiotics, tid bentyl and peppermint supplement normal appetite, stable weight.  no reflux, no dysphagia stools at baseline with 1-3 bm/day, no blood. , no mucus no other complaints  Prior hx: previously tried xifaxan without relief normal fecal elastace in 2019 prior cte with left sided small bowel thickening with infectious vs inflammatory enteritis and hydronephrosis previously with mild improvement with lactose free diet and FODMAP Diet.   last flex in 12/2016 with normal j pouch on exam and on pathology she drinks lactose free milk and avoids icecream.  ? lactose intolerance.   s/p candelaria\  pouchoscopy 2019 was able to advance into the small bowel normal findings. Pouchoscopy 2016 normal EGD 2007 gastritis otherwise normal CT enterography 2019 wall thickening involving the small bowel loops in the left upper quad distal postop changes with J-pouch status post colectomy impression wall thickening of small bowel loops consistent with inflammation or any factious enteritis.]

## 2024-05-31 ENCOUNTER — ERX REFILL RESPONSE (OUTPATIENT)
Dept: URBAN - METROPOLITAN AREA CLINIC 80 | Facility: CLINIC | Age: 64
End: 2024-05-31

## 2024-05-31 RX ORDER — DICYCLOMINE HYDROCHLORIDE 10 MG/1
TAKE ONE CAPSULE BY MOUTH FOUR TIMES A DAY FOR 90 DAYS CAPSULE ORAL
Qty: 360 CAPSULE | Refills: 1 | OUTPATIENT

## 2024-06-02 ENCOUNTER — WEB ENCOUNTER (OUTPATIENT)
Dept: URBAN - METROPOLITAN AREA CLINIC 128 | Facility: CLINIC | Age: 64
End: 2024-06-02

## 2024-06-02 RX ORDER — DICYCLOMINE HYDROCHLORIDE 10 MG/1
TAKE ONE CAPSULE BY MOUTH FOUR TIMES A DAY PRN FOR 90 DAYS CAPSULE ORAL
Qty: 180 | Refills: 3

## 2024-12-04 ENCOUNTER — TELEPHONE ENCOUNTER (OUTPATIENT)
Dept: URBAN - METROPOLITAN AREA CLINIC 80 | Facility: CLINIC | Age: 64
End: 2024-12-04

## 2024-12-04 RX ORDER — DICYCLOMINE HYDROCHLORIDE 10 MG/1
TAKE ONE CAPSULE BY MOUTH FOUR TIMES A DAY PRN FOR 90 DAYS CAPSULE ORAL
Qty: 180 | Refills: 3
End: 2025-11-29

## 2024-12-06 ENCOUNTER — DASHBOARD ENCOUNTERS (OUTPATIENT)
Age: 64
End: 2024-12-06

## 2024-12-06 ENCOUNTER — OFFICE VISIT (OUTPATIENT)
Dept: URBAN - METROPOLITAN AREA CLINIC 128 | Facility: CLINIC | Age: 64
End: 2024-12-06
Payer: COMMERCIAL

## 2024-12-06 VITALS
TEMPERATURE: 98.2 F | DIASTOLIC BLOOD PRESSURE: 80 MMHG | HEIGHT: 62 IN | BODY MASS INDEX: 21.75 KG/M2 | SYSTOLIC BLOOD PRESSURE: 120 MMHG | HEART RATE: 82 BPM | OXYGEN SATURATION: 97 % | WEIGHT: 118.2 LBS

## 2024-12-06 DIAGNOSIS — K58.9 IBS (IRRITABLE BOWEL SYNDROME)-DIARRHEA: ICD-10-CM

## 2024-12-06 DIAGNOSIS — K56.600 PARTIAL SMALL BOWEL OBSTRUCTION: ICD-10-CM

## 2024-12-06 DIAGNOSIS — Z87.19 HISTORY OF ULCERATIVE COLITIS: ICD-10-CM

## 2024-12-06 DIAGNOSIS — Z12.11 COLON CANCER SCREENING: ICD-10-CM

## 2024-12-06 DIAGNOSIS — R14.0 BLOATING: ICD-10-CM

## 2024-12-06 DIAGNOSIS — K91.850 POUCHITIS: ICD-10-CM

## 2024-12-06 PROCEDURE — 99214 OFFICE O/P EST MOD 30 MIN: CPT | Performed by: PHYSICIAN ASSISTANT

## 2024-12-06 RX ORDER — LEVOTHYROXINE SODIUM 100 UG/1
TABLET ORAL
Qty: 0 | Refills: 0 | Status: ACTIVE | COMMUNITY
Start: 1900-01-01

## 2024-12-06 RX ORDER — ESTRADIOL 0.1 MG/D
FILM, EXTENDED RELEASE TRANSDERMAL
Qty: 0 | Refills: 0 | Status: ACTIVE | COMMUNITY
Start: 1900-01-01

## 2024-12-06 RX ORDER — RIFAXIMIN 550 MG/1
1 TABLET TABLET ORAL THREE TIMES A DAY
Qty: 42 TABLET | Refills: 0 | OUTPATIENT
Start: 2024-12-06 | End: 2024-12-20

## 2024-12-06 RX ORDER — DICYCLOMINE HYDROCHLORIDE 10 MG/1
TAKE ONE CAPSULE BY MOUTH FOUR TIMES A DAY PRN FOR 90 DAYS CAPSULE ORAL
Qty: 180 | Refills: 3 | Status: ACTIVE | COMMUNITY
End: 2025-11-29

## 2024-12-06 NOTE — PHYSICAL EXAM GASTROINTESTINAL
Abdomen , soft, non-tender, nondistended , no guarding or rigidity , no masses palpable , normal bowel sounds, negative Cruz's sign, negative Rovsing's sign, negative psoas and obturators signs, negative CVA tenderness bilaterally, old surgical scars noted Liver and Spleen , no hepatosplenomegaly Rectal deferred

## 2024-12-06 NOTE — HPI-TODAY'S VISIT:
pt is pleasant 62 yo female who is here for a hospital follow up visit. She was admitted to [facility] from 11/1-4/2024 for a small bowel obstruction which has now resolved. Nausea, vomiting have resolved. Has known ulcerative colitis and hypothyroidism. CT scan of abdomen revealed distended small bowel bowel which is fluid-filled measuring u to 4.8cm as well as fluid-filled and distended stomach and esophagus concerning for proximal small bowel obtruction, a transition point is present in the right mid abdomen and region of bowel twisting, small volume free fluid is present likely reactive septal twisting and bowel obstruction.  She had multiple bowel movements following this. WBC 5.6, hgb/hct 12.4/37.9, platelet 193, normal LFTs, lipase 34. small bowel series revealed mild bowel dilatation with progression of contrast into the rectum, may be due to partial small bowel obstruction, recently resolved bowel obstruction or ileus. last seen in 5/2023 prior hx of ulcerative colitis s/p colectomy with j pouch done 35 yrs ago. s/p pouchoscopy in 10/4/19 with normal biopsies hx of diarrhea and bloating managed with bentyl here for follow up  pt seen last year with Kezia Lee for ongoing issues with bloating stool test and CT enterography ordered but not done pt notes busy year after developing osteomyelitis and needing IV abx o/w has been well prior issues with loose stools/diarrhea responsive to bentyl worse with fatty/rich food.  does not want to have fecal elastace checked no abd pain, no n/v she continues to limit her diet and avoids dairy, MSG, ham, and casein she is on probiotics, tid bentyl and peppermint supplement normal appetite, stable weight.  no reflux, no dysphagia stools at baseline with 1-3 bm/day, no blood. , no mucus no other complaints She had been on bentyl 4 times a day  Prior hx: previously tried xifaxan without relief normal fecal elastace in 2019 prior cte with left sided small bowel thickening with infectious vs inflammatory enteritis and hydronephrosis previously with mild improvement with lactose free diet and FODMAP Diet.   last flex in 12/2016 with normal j pouch on exam and on pathology she drinks lactose free milk and avoids icecream.  ? lactose intolerance.   s/p cholecystectomy  pouchoscopy 2019 was able to advance into the small bowel normal findings. Pouchoscopy 2016 normal EGD 2007 gastritis otherwise normal CT enterography 2019 wall thickening involving the small bowel loops in the left upper quad distal postop changes with J-pouch status post colectomy impression wall thickening of small bowel loops consistent with inflammation or any factious enteritis. Has a J pouch (35 years) for her UC management. Has 2 BMs a day

## 2024-12-09 ENCOUNTER — TELEPHONE ENCOUNTER (OUTPATIENT)
Dept: URBAN - METROPOLITAN AREA CLINIC 128 | Facility: CLINIC | Age: 64
End: 2024-12-09

## 2024-12-10 ENCOUNTER — TELEPHONE ENCOUNTER (OUTPATIENT)
Dept: URBAN - METROPOLITAN AREA CLINIC 128 | Facility: CLINIC | Age: 64
End: 2024-12-10

## 2024-12-10 ENCOUNTER — WEB ENCOUNTER (OUTPATIENT)
Dept: URBAN - METROPOLITAN AREA CLINIC 128 | Facility: CLINIC | Age: 64
End: 2024-12-10

## 2024-12-12 ENCOUNTER — WEB ENCOUNTER (OUTPATIENT)
Dept: URBAN - METROPOLITAN AREA CLINIC 128 | Facility: CLINIC | Age: 64
End: 2024-12-12

## 2025-05-07 ENCOUNTER — OFFICE VISIT (OUTPATIENT)
Dept: URBAN - METROPOLITAN AREA CLINIC 19 | Facility: CLINIC | Age: 65
End: 2025-05-07
Payer: COMMERCIAL

## 2025-05-07 DIAGNOSIS — Z12.11 COLON CANCER SCREENING: ICD-10-CM

## 2025-05-07 DIAGNOSIS — K58.9 IBS (IRRITABLE BOWEL SYNDROME)-DIARRHEA: ICD-10-CM

## 2025-05-07 DIAGNOSIS — K63.8219 SMALL INTESTINAL BACTERIAL OVERGROWTH (SIBO): ICD-10-CM

## 2025-05-07 DIAGNOSIS — Z87.19 HISTORY OF ULCERATIVE COLITIS: ICD-10-CM

## 2025-05-07 PROCEDURE — 99214 OFFICE O/P EST MOD 30 MIN: CPT | Performed by: STUDENT IN AN ORGANIZED HEALTH CARE EDUCATION/TRAINING PROGRAM

## 2025-05-07 RX ORDER — AMOXICILLIN AND CLAVULANATE POTASSIUM 875; 125 MG/1; MG/1
1 TABLET TABLET, FILM COATED ORAL
Qty: 28 TABLET | OUTPATIENT
Start: 2025-05-07 | End: 2025-05-21

## 2025-05-07 RX ORDER — LEVOTHYROXINE SODIUM 100 UG/1
TABLET ORAL
Qty: 0 | Refills: 0 | Status: ACTIVE | COMMUNITY
Start: 1900-01-01

## 2025-05-07 RX ORDER — ESTRADIOL 0.1 MG/D
FILM, EXTENDED RELEASE TRANSDERMAL
Qty: 0 | Refills: 0 | Status: ACTIVE | COMMUNITY
Start: 1900-01-01

## 2025-05-07 RX ORDER — DICYCLOMINE HYDROCHLORIDE 10 MG/1
TAKE ONE CAPSULE BY MOUTH FOUR TIMES A DAY PRN FOR 90 DAYS CAPSULE ORAL
Qty: 180 | Refills: 3 | Status: ACTIVE | COMMUNITY
End: 2025-11-29

## 2025-05-07 NOTE — HPI-TODAY'S VISIT:
This is a 64-year-old female patient with history of ulcerative colitis s/p colectomy with J-pouch done at age 36, s/p proctoscopy done in 10//2019 with normal biopsies.,  History of diarrhea and bloating managed with Bentyl  Last time she was seen in the GI clinic was on 12/6/2024.  At that time the note indicated that patient was seen a year before by  Tammy Lee for ongoing issue of bloating.  At that time stool test CT enterography was ordered but were not done..  Her issues also include loose stools and diarrhea that are responsive to Bentyl.  Patient has been on probiotics, 3 times daily Bentyl and peppermint supplement.  During the last visit Previous labs normal fecal elastase 2019, prior CTs with left-sided small bowel thickening with infectious versus inflammatory enteritis. Previously with mild improvement with lack dose of free and FODMAP diet  Last flex sig in 12/26 with normal J-pouch on exam and on pathology Status post cholecystectomy Pouchoscopy 2019 was able to advance into the small bowel normal finding  EGD 2007 gastritis otherwise normal CT enterography 2019 wall thickening involving the small bowel loops in the left upper and distal postop changes in the J-pouch status post colectomy impression wall thickening small bowel loops consistent with inflammatory or infection enteritis the plan was to do pouchoscopy to rule out active UC but she declined it.  Continue lactose-free diet and continue probiotics Last visit stridor rifaximin for bloating however.  Patient is having adequate soft bowel per day.  Bloated after eating especially at bedtime.  Denies any blood in her stool.  Denies any nausea and vomiting at this time.  She does take Bentyl as needed as well as peppermint supplements. Patient previous admission/4/2024 with small bowel obstruction did not resolve.  At that time she had a CT of the abdomen revealed distended small bowel with fluid-filled measuring 4.8 cm as well as fluid-filled and distended stomach transition point is present in the right mid abdomen in the region of small bowel extending small volume free fluid is present likely reactive.  Small bowel obstruction. Previous labs reviewed celiac panel negative, fecal elastase negative.

## 2025-06-02 ENCOUNTER — WEB ENCOUNTER (OUTPATIENT)
Dept: URBAN - METROPOLITAN AREA CLINIC 19 | Facility: CLINIC | Age: 65
End: 2025-06-02

## 2025-06-08 ENCOUNTER — WEB ENCOUNTER (OUTPATIENT)
Dept: URBAN - METROPOLITAN AREA CLINIC 19 | Facility: CLINIC | Age: 65
End: 2025-06-08

## 2025-06-10 ENCOUNTER — OFFICE VISIT (OUTPATIENT)
Dept: URBAN - METROPOLITAN AREA CLINIC 19 | Facility: CLINIC | Age: 65
End: 2025-06-10

## 2025-08-15 ENCOUNTER — OFFICE VISIT (OUTPATIENT)
Dept: URBAN - METROPOLITAN AREA CLINIC 19 | Facility: CLINIC | Age: 65
End: 2025-08-15